# Patient Record
Sex: MALE
[De-identification: names, ages, dates, MRNs, and addresses within clinical notes are randomized per-mention and may not be internally consistent; named-entity substitution may affect disease eponyms.]

---

## 2018-02-10 ENCOUNTER — HOSPITAL ENCOUNTER (EMERGENCY)
Dept: HOSPITAL 14 - H.ER | Age: 33
Discharge: HOME | End: 2018-02-10
Payer: COMMERCIAL

## 2018-02-10 VITALS
SYSTOLIC BLOOD PRESSURE: 144 MMHG | OXYGEN SATURATION: 100 % | HEART RATE: 101 BPM | TEMPERATURE: 97.2 F | DIASTOLIC BLOOD PRESSURE: 83 MMHG

## 2018-02-10 VITALS — BODY MASS INDEX: 31.1 KG/M2

## 2018-02-10 VITALS — RESPIRATION RATE: 18 BRPM

## 2018-02-10 DIAGNOSIS — J70.5: Primary | ICD-10-CM

## 2018-02-10 LAB
ARTERIAL BLOOD GAS HEMOGLOBIN: 14.6 G/DL (ref 11.7–17.4)
ARTERIAL BLOOD GAS O2 SAT: 99.7 % (ref 95–98)
ARTERIAL BLOOD GAS O2 SAT: 99.7 % (ref 95–98)
ARTERIAL BLOOD GAS PCO2: 38 MM/HG (ref 35–45)
ARTERIAL BLOOD GAS PCO2: 39 MM/HG (ref 35–45)
ARTERIAL BLOOD GAS TCO2: 25.3 MMOL/L (ref 22–28)
ARTERIAL BLOOD GAS TCO2: 26.5 MMOL/L (ref 22–28)
ARTERIAL PATENCY WRIST A: YES
ARTERIAL PATENCY WRIST A: YES
HCO3 BLDA-SCNC: 24.6 MMOL/L (ref 21–28)
HCO3 BLDA-SCNC: 25.6 MMOL/L (ref 21–28)
INHALED O2 CONCENTRATION: 100 %
INHALED O2 CONCENTRATION: 100 %
O2 CAP BLDA-SCNC: 20.4 ML/DL (ref 16–24)
O2 CT BLDA-SCNC: 20.3 ML/DL (ref 15–23)
PH BLDA: 7.41 [PH] (ref 7.35–7.45)
PH BLDA: 7.42 [PH] (ref 7.35–7.45)
PO2 BLDA: 292 MM/HG (ref 80–100)
PO2 BLDA: 323 MM/HG (ref 80–100)
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
